# Patient Record
Sex: FEMALE | Race: WHITE | URBAN - METROPOLITAN AREA
[De-identification: names, ages, dates, MRNs, and addresses within clinical notes are randomized per-mention and may not be internally consistent; named-entity substitution may affect disease eponyms.]

---

## 2018-10-10 ENCOUNTER — THERAPY VISIT (OUTPATIENT)
Dept: PHYSICAL THERAPY | Facility: CLINIC | Age: 78
End: 2018-10-10
Payer: COMMERCIAL

## 2018-10-10 DIAGNOSIS — M54.2 NECK PAIN: Primary | ICD-10-CM

## 2018-10-10 PROCEDURE — 97161 PT EVAL LOW COMPLEX 20 MIN: CPT | Mod: GP | Performed by: PHYSICAL THERAPIST

## 2018-10-10 PROCEDURE — 97140 MANUAL THERAPY 1/> REGIONS: CPT | Mod: GP | Performed by: PHYSICAL THERAPIST

## 2018-10-10 PROCEDURE — 97530 THERAPEUTIC ACTIVITIES: CPT | Mod: GP | Performed by: PHYSICAL THERAPIST

## 2018-10-10 NOTE — LETTER
DEPARTMENT OF HEALTH AND HUMAN SERVICES  CENTERS FOR MEDICARE & MEDICAID SERVICES    PLAN/UPDATED PLAN OF PROGRESS FOR OUTPATIENT REHABILITATION    PATIENTS NAME:  Megan Lopez     : 1940    PROVIDER NUMBER:    [id number]    HICN:  [id number]     PROVIDER NAME: [place] [facility] PHYSICAL THERAPY    MEDICAL RECORD NUMBER: [id number]     START OF CARE DATE:  SOC Date: 10/10/18   TYPE:  PT    PRIMARY/TREATMENT DIAGNOSIS: (Pertinent Medical Diagnosis)  Neck pain    VISITS FROM START OF CARE:  Rxs Used: 1     [place] [facility] Initial Evaluation  Physical Therapy Initial Examination/Evaluation      Megan Lopez  is a 78 year old  female referred to physical therapy by Dr. Avila for treatment of neck pain.      DOI/onset 4-5 weeks ago  Mechanism of injury none, chronic in nature  DOS none  Prior treatment physical therapy. Effect of prior treatment good, it was more on the other side.  After so many years, I forgot what I was supposed to do for exercise.      Chief Complaint:   Neck pain down into the head on the right.  Deep inside the shoulder blade.     Pain location: R cervical, head, scapular  Quality: burning  Constant/Intermittent: constant, varies in intensity  Symptoms have worsened since onset.    Current pain 5/10.  Pain at worst 8/10.    Symptoms aggravated by ipad.    Symptoms improved with gentle stretching, soft tissue through the edge of the muscle.     Social history:  Pt is , lives in a home.  Pt has 3 grown children.  6 grandchildren, 2 great grandchildren.  Pt enjoys walking and biking.  The last time I biked it was really annoying the following day.    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    Patient having difficulty with ADLs: sleeping, recently had a pulse ox test and I will be doing a sleep study.    Patient's goals are improve pain.    Patient reports general health as good.  Related medical history hx  breast cancer, sleep disorder.    Surgical History:  Lumpectomy, bunionectomy (3) hysterectomy.    Imaging: none.    Medications:  advil PRN, crestor, zolpidem.     PATIENTS NAME:  Megan Lopez     Outcome measure:   NDI  Return to MD:  As needed.      Clinical Impression: Megan presents to [facility] with primary complaint of neck pain.  Per clinical examination, pt demonstrates significant restrictions through the right upper cervical region with postural syndrome.  Pt with positive response to postural re-education and manual therapy today in clinic.   To further assess UE muscular strength next.  Pt will benefit from progressive cervical and sofy-scapular strengthening to improve overall pain and functional tolerance.  See plan of care outlined below.      Subjective:  Seated:   - mild forward head, rounded shoulders.  Fair posture.    - (-) shoulder screen                 Objective:      Cervical/Thoracic Evaluation  AROM:  AROM Cervical:  Flexion:            23  Extension:       27  Rotation:         Left: 56     Right: 51  Side Bend:      Left: 6 pulling R      Right:  11 deg     Headaches: migraine     Shoulder Evaluation:  ROM:  AROM:  : WFL - painfree.    (-) Spurlings, quadrant testing, (-) Alar ligament  (+) manual traction   Palpation: moderate restrictions through R C2-4 cervical paraspinals.    (+) TTP R>L Suboccipitals      Assessment/Plan:    Patient is a 78 year old female with cervical complaints.    Patient has the following significant findings with corresponding treatment plan.                Diagnosis 1:  Neck pain, mid cervical pathology suspected    Pain -  hot/cold therapy, US, manual therapy, self management, education and home program  Decreased ROM/flexibility - manual therapy and therapeutic exercise  Decreased joint mobility - manual therapy and therapeutic exercise  Decreased strength - therapeutic exercise and therapeutic activities  Impaired muscle performance - neuro  re-education  Decreased function - therapeutic activities  Impaired posture - neuro re-education    Therapy Evaluation Codes:   1) History comprised of:   Personal factors that impact the plan of care:      Past/current experiences.    Comorbidity factors that impact the plan of care are:      Cancer.     Medications impacting care: dvil PRN, crestor, zolpidem..    PATIENTS NAME:  Megan Lopez     2) Examination of Body Systems comprised of:   Body structures and functions that impact the plan of care:      Cervical spine.   Activity limitations that impact the plan of care are:      Reading/Computer work and Sitting.  3) Clinical presentation characteristics are:   Stable/Uncomplicated.  4) Decision-Making    Low complexity using standardized patient assessment instrument and/or measureable assessment of functional outcome.  Cumulative Therapy Evaluation is: Low complexity.    Previous and current functional limitations:  (See Goal Flow Sheet for this information)    Short term and Long term goals: (See Goal Flow Sheet for this information)     Communication ability:  Patient appears to be able to clearly communicate and understand verbal and written communication and follow directions correctly.  Treatment Explanation - The following has been discussed with the patient:   RX ordered/plan of care  Anticipated outcomes  Possible risks and side effects  This patient would benefit from PT intervention to resume normal activities.   Rehab potential is good.    Frequency:  2 X week, once daily  Duration:  for 4 weeks, then 1x/week for 4 weeks.  Discharge Plan:  Achieve all LTG.  Independent in home treatment program.  Reach maximal therapeutic benefit.    Please refer to the daily flowsheet for treatment today, total treatment time and time spent performing 1:1 timed codes.       Caregiver Signature/Credentials _____________________________ Date ________       Treating Provider: Viviane Tapia DPT    I have  "reviewed and certified the need for these services and plan of treatment while under my care.        PHYSICIAN'S SIGNATURE:   _________________________________________  Date___________   M Viktoriya Avila MD    Certification period:  Beginning of Cert date period: 10/10/18 to  End of Cert period date: 12/08/18     Functional Level Progress Report: Please see attached \"Goal Flow sheet for Functional level.\"    ____X____ Continue Services or       ________ DC Services                Service dates: From  SOC Date: 10/10/18 date to present                         "

## 2018-10-10 NOTE — PROGRESS NOTES
[place] [facility] Initial Evaluation  Physical Therapy Initial Examination/Evaluation    October 10, 2018    Megan Lopez  is a 78 year old  female referred to physical therapy by Dr. Avila for treatment of neck pain.      DOI/onset 4-5 weeks ago; 9/5/2018  Mechanism of injury none, chronic in nature  DOS none  Prior treatment physical therapy. Effect of prior treatment good, it was more on the other side.  After so many years, I forgot what I was supposed to do for exercise.      Chief Complaint:   Neck pain down into the head on the right.  Deep inside the shoulder blade.     Pain location: R cervical, head, scapular  Quality: burning  Constant/Intermittent: constant, varies in intensity  Symptoms have worsened since onset.    Current pain 5/10.  Pain at worst 8/10.    Symptoms aggravated by ipad.    Symptoms improved with gentle stretching, soft tissue through the edge of the muscle.     Social history:  Pt is , lives in a home.  Pt has 3 grown children.  6 grandchildren, 2 great grandchildren.  Pt enjoys walking and biking.  The last time I biked it was really annoying the following day.    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    Patient having difficulty with ADLs: sleeping, recently had a pulse ox test and I will be doing a sleep study.    Patient's goals are improve pain.    Patient reports general health as good.  Related medical history hx breast cancer, sleep disorder.    Surgical History:  Lumpectomy, bunionectomy (3) hysterectomy.    Imaging: none.    Medications:  advil PRN, crestor, zolpidem.       Outcome measure:   NDI  Return to MD:  As needed.      Clinical Impression: Megan presents to [facility] with primary complaint of neck pain.  Per clinical examination, pt demonstrates significant restrictions through the right upper cervical region with postural syndrome.  Pt with positive response to postural re-education and manual therapy today  in clinic.   To further assess UE muscular strength next.  Pt will benefit from progressive cervical and sofy-scapular strengthening to improve overall pain and functional tolerance.  See plan of care outlined below.    Subjective:    Seated:   - mild forward head, rounded shoulders.  Fair posture.    - (-) shoulder screen     HPI                    Objective:  System              Cervical/Thoracic Evaluation    AROM:  AROM Cervical:    Flexion:            23  Extension:       27  Rotation:         Left: 56     Right: 51  Side Bend:      Left: 6 pulling R      Right:  11 deg       Headaches: migraine                         Shoulder Evaluation:  ROM:  AROM:  : WFL - painfree.                                                                         (-) Spurlings, quadrant testing, (-) Alar ligament  (+) manual traction   Palpation: moderate restrictions through R C2-4 cervical paraspinals.    (+) TTP R>L Suboccipitals      General     ROS    Assessment/Plan:    Patient is a 78 year old female with cervical complaints.    Patient has the following significant findings with corresponding treatment plan.                Diagnosis 1:  Neck pain, mid cervical pathology suspected    Pain -  hot/cold therapy, US, manual therapy, self management, education and home program  Decreased ROM/flexibility - manual therapy and therapeutic exercise  Decreased joint mobility - manual therapy and therapeutic exercise  Decreased strength - therapeutic exercise and therapeutic activities  Impaired muscle performance - neuro re-education  Decreased function - therapeutic activities  Impaired posture - neuro re-education    Therapy Evaluation Codes:   1) History comprised of:   Personal factors that impact the plan of care:      Past/current experiences.    Comorbidity factors that impact the plan of care are:      Cancer.     Medications impacting care: dvil PRN, crestor, zolpidem..  2) Examination of Body Systems comprised of:   Body  structures and functions that impact the plan of care:      Cervical spine.   Activity limitations that impact the plan of care are:      Reading/Computer work and Sitting.  3) Clinical presentation characteristics are:   Stable/Uncomplicated.  4) Decision-Making    Low complexity using standardized patient assessment instrument and/or measureable assessment of functional outcome.  Cumulative Therapy Evaluation is: Low complexity.    Previous and current functional limitations:  (See Goal Flow Sheet for this information)    Short term and Long term goals: (See Goal Flow Sheet for this information)     Communication ability:  Patient appears to be able to clearly communicate and understand verbal and written communication and follow directions correctly.  Treatment Explanation - The following has been discussed with the patient:   RX ordered/plan of care  Anticipated outcomes  Possible risks and side effects  This patient would benefit from PT intervention to resume normal activities.   Rehab potential is good.    Frequency:  2 X week, once daily  Duration:  for 4 weeks, then 1x/week for 4 weeks.  Discharge Plan:  Achieve all LTG.  Independent in home treatment program.  Reach maximal therapeutic benefit.    Please refer to the daily flowsheet for treatment today, total treatment time and time spent performing 1:1 timed codes.

## 2018-10-10 NOTE — LETTER
DEPARTMENT OF HEALTH AND HUMAN SERVICES  CENTERS FOR MEDICARE & MEDICAID SERVICES    PLAN/UPDATED PLAN OF PROGRESS FOR OUTPATIENT REHABILITATION    PATIENTS NAME:  Megan Lopez     : 1940    PROVIDER NUMBER:    [id number]    HICN:  [id number]    PROVIDER NAME: [place] [facility] PHYSICAL THERAPY    MEDICAL RECORD NUMBER: [id number]     START OF CARE DATE:  SOC Date: 10/10/18   TYPE:  PT    PRIMARY/TREATMENT DIAGNOSIS: (Pertinent Medical Diagnosis)  Neck pain    VISITS FROM START OF CARE:  Rxs Used: 1     [place] [facility] Initial Evaluation  Physical Therapy Initial Examination/Evaluation    October 10, 2018    Megan Lopez  is a 78 year old  female referred to physical therapy by Dr. Avila for treatment of neck pain.      DOI/onset 4-5 weeks ago; 2018  Mechanism of injury none, chronic in nature  DOS none  Prior treatment physical therapy. Effect of prior treatment good, it was more on the other side.  After so many years, I forgot what I was supposed to do for exercise.      Chief Complaint:   Neck pain down into the head on the right.  Deep inside the shoulder blade.     Pain location: R cervical, head, scapular  Quality: burning  Constant/Intermittent: constant, varies in intensity  Symptoms have worsened since onset.    Current pain 5/10.  Pain at worst 8/10.    Symptoms aggravated by ipad.    Symptoms improved with gentle stretching, soft tissue through the edge of the muscle.     Social history:  Pt is , lives in a home.  Pt has 3 grown children.  6 grandchildren, 2 great grandchildren.  Pt enjoys walking and biking.  The last time I biked it was really annoying the following day.    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    Patient having difficulty with ADLs: sleeping, recently had a pulse ox test and I will be doing a sleep study.    Patient's goals are improve pain.  PATIENTS NAME:  Megan Lopez      Patient reports general health as good.  Related medical history hx breast cancer, sleep disorder.    Surgical History:  Lumpectomy, bunionectomy (3) hysterectomy.    Imaging: none.    Medications:  advil PRN, crestor, zolpidem.    Outcome measure:   NDI  Return to MD:  As needed.      Clinical Impression: Megan presents to [facility] with primary complaint of neck pain.  Per clinical examination, pt demonstrates significant restrictions through the right upper cervical region with postural syndrome.  Pt with positive response to postural re-education and manual therapy today in clinic.   To further assess UE muscular strength next.  Pt will benefit from progressive cervical and sofy-scapular strengthening to improve overall pain and functional tolerance.  See plan of care outlined below.    Subjective:  Seated:   - mild forward head, rounded shoulders.  Fair posture.    - (-) shoulder screen     Cervical/Thoracic Evaluation  AROM:  AROM Cervical:  Flexion:            23  Extension:       27  Rotation:         Left: 56     Right: 51  Side Bend:      Left: 6 pulling R      Right:  11 deg     Headaches: migraine      Shoulder Evaluation:  AROM:  : WFL - painfree.  (-) Spurlings, quadrant testing, (-) Alar ligament  (+) manual traction   Palpation: moderate restrictions through R C2-4 cervical paraspinals.    (+) TTP R>L Suboccipitals      Assessment/Plan:    Patient is a 78 year old female with cervical complaints.    Patient has the following significant findings with corresponding treatment plan.                Diagnosis 1:  Neck pain, mid cervical pathology suspected    Pain -  hot/cold therapy, US, manual therapy, self management, education and home program  Decreased ROM/flexibility - manual therapy and therapeutic exercise  Decreased joint mobility - manual therapy and therapeutic exercise  Decreased strength - therapeutic exercise and therapeutic activities  Impaired muscle performance - neuro  re-education  Decreased function - therapeutic activities  Impaired posture - neuro re-education        PATIENTS NAME:  Megan Lopez     Therapy Evaluation Codes:   1) History comprised of:   Personal factors that impact the plan of care:      Past/current experiences.    Comorbidity factors that impact the plan of care are:      Cancer.     Medications impacting care: dvil PRN, crestor, zolpidem..  2) Examination of Body Systems comprised of:   Body structures and functions that impact the plan of care:      Cervical spine.   Activity limitations that impact the plan of care are:      Reading/Computer work and Sitting.  3) Clinical presentation characteristics are:   Stable/Uncomplicated.  4) Decision-Making    Low complexity using standardized patient assessment instrument and/or measureable assessment of functional outcome.  Cumulative Therapy Evaluation is: Low complexity.    Previous and current functional limitations:  (See Goal Flow Sheet for this information)    Short term and Long term goals: (See Goal Flow Sheet for this information)   Communication ability:  Patient appears to be able to clearly communicate and understand verbal and written communication and follow directions correctly.  Treatment Explanation - The following has been discussed with the patient:   RX ordered/plan of care  Anticipated outcomes  Possible risks and side effects  This patient would benefit from PT intervention to resume normal activities.   Rehab potential is good.  Frequency:  2 X week, once daily  Duration:  for 4 weeks, then 1x/week for 4 weeks.  Discharge Plan:  Achieve all LTG.  Independent in home treatment program.  Reach maximal therapeutic benefit.    Caregiver Signature/Credentials _____________________________ Date ________       Treating Provider: Viviane Tapia DPT    I have reviewed and certified the need for these services and plan of treatment while under my care.      PHYSICIAN'S SIGNATURE:    "_________________________________________  Date___________   M Viktoriya Avila MD     Certification period:  Beginning of Cert date period: 10/10/18 to  End of Cert period date: 12/08/18     Functional Level Progress Report: Please see attached \"Goal Flow sheet for Functional level.\"  ____X____ Continue Services or       ________ DC Services                Service dates: From  SOC Date: 10/10/18 date to present                         "

## 2018-10-10 NOTE — MR AVS SNAPSHOT
After Visit Summary   10/10/2018    Megan Lopez    MRN: [id number]           Patient Information     Date Of Birth          1940        Visit Information        Provider Department      10/10/2018 8:50 AM Viviane Tapia PT [facility]        Today's Diagnoses     Neck pain    -  1       Follow-ups after your visit        Your next 10 appointments already scheduled     Oct 16, 2018  9:30 AM CDT   CHRISTA Spine with Viviane Tapia PT   [facility] ([facility]  )    [address] [place] [id number] [phone]            Oct 23, 2018 10:50 AM CDT   CHRISTA Spine with Viviane Tapia PT   [facility] ([address] [address] [phone]              Who to contact     If you have questions or need follow up information about today's clinic visit or your schedule please [facility] directly at [phone].  Normal or non-critical lab and imaging results will be communicated to you by MyChart, letter or phone within 4 business days after the clinic has received the results. If you do not hear from us within 7 days, please contact the clinic through MyChart or phone. If you have a critical or abnormal lab result, we will notify you by phone as soon as possible.  Submit refill requests through [employer] or call your pharmacy and they will forward the refill request to us. Please allow 3 business days for your refill to be completed.          Additional Information About Your Visit        Care EveryWhere ID     This is your Care EveryWhere ID. This could be used by other organizations to access your [place] medical records  [id number]         Blood Pressure from Last 3 Encounters:   No data found for BP    Weight from Last 3  Encounters:   No data found for Wt              We Performed the Following     CHRISTA CERT REPORT     CHRISTA Inital Eval Report     Manual Ther Tech, 1+Regions, EA 15 min     PT Eval, Low Complexity ([id number])     Therapeutic Activities        Primary Care Provider Office Phone # Fax #    [facility] [phone]       [address] [place] [id number]        Equal Access to Services     [facility] SHANTANU : Hadii aad ku hadasho Soomaali, waaxda luqadaha, qaybta kaalmada adeegyada, waxay idiin hayaan adeeg kharash la'aan ah. So [facility] [phone].    ATENCIÓN: Si habla español, tiene a taveras disposición servicios gratuitos de asistencia lingüística. Llame al [phone].    We comply with applicable federal civil rights laws and [place] laws. We do not discriminate on the basis of race, color, national origin, age, disability, sex, sexual orientation, or gender identity.            Thank you!     Thank you for choosing [place] [facility] PHYSICAL THERAPY  for your care. Our goal is always to provide you with excellent care. Hearing back from our patients is one way we can continue to improve our services. Please take a few minutes to complete the written survey that you may receive in the mail after your visit with us. Thank you!             Your Updated Medication List - Protect others around you: Learn how to safely use, store and throw away your medicines at www.disposemymeds.org.      Notice  As of 10/10/2018  8:25 PM    You have not been prescribed any medications.

## 2018-10-16 ENCOUNTER — THERAPY VISIT (OUTPATIENT)
Dept: PHYSICAL THERAPY | Facility: CLINIC | Age: 78
End: 2018-10-16
Payer: COMMERCIAL

## 2018-10-16 DIAGNOSIS — M54.2 NECK PAIN: ICD-10-CM

## 2018-10-16 PROCEDURE — 97035 APP MDLTY 1+ULTRASOUND EA 15: CPT | Mod: GP | Performed by: PHYSICAL THERAPIST

## 2018-10-16 PROCEDURE — 97140 MANUAL THERAPY 1/> REGIONS: CPT | Mod: GP | Performed by: PHYSICAL THERAPIST

## 2018-10-16 PROCEDURE — 97112 NEUROMUSCULAR REEDUCATION: CPT | Mod: GP | Performed by: PHYSICAL THERAPIST

## 2018-10-23 ENCOUNTER — THERAPY VISIT (OUTPATIENT)
Dept: PHYSICAL THERAPY | Facility: CLINIC | Age: 78
End: 2018-10-23
Payer: COMMERCIAL

## 2018-10-23 DIAGNOSIS — M54.2 NECK PAIN: ICD-10-CM

## 2018-10-23 PROCEDURE — 97035 APP MDLTY 1+ULTRASOUND EA 15: CPT | Mod: GP | Performed by: PHYSICAL THERAPIST

## 2018-10-23 PROCEDURE — 97110 THERAPEUTIC EXERCISES: CPT | Mod: GP | Performed by: PHYSICAL THERAPIST

## 2018-10-23 PROCEDURE — 97140 MANUAL THERAPY 1/> REGIONS: CPT | Mod: GP | Performed by: PHYSICAL THERAPIST

## 2018-11-06 ENCOUNTER — THERAPY VISIT (OUTPATIENT)
Dept: PHYSICAL THERAPY | Facility: CLINIC | Age: 78
End: 2018-11-06
Payer: COMMERCIAL

## 2018-11-06 DIAGNOSIS — M54.2 NECK PAIN: ICD-10-CM

## 2018-11-06 PROCEDURE — 97110 THERAPEUTIC EXERCISES: CPT | Mod: GP | Performed by: PHYSICAL THERAPIST

## 2018-11-06 PROCEDURE — 97035 APP MDLTY 1+ULTRASOUND EA 15: CPT | Mod: GP | Performed by: PHYSICAL THERAPIST

## 2018-11-06 PROCEDURE — 97140 MANUAL THERAPY 1/> REGIONS: CPT | Mod: GP | Performed by: PHYSICAL THERAPIST

## 2018-11-15 ENCOUNTER — THERAPY VISIT (OUTPATIENT)
Dept: PHYSICAL THERAPY | Facility: CLINIC | Age: 78
End: 2018-11-15
Payer: COMMERCIAL

## 2018-11-15 DIAGNOSIS — M54.2 NECK PAIN: ICD-10-CM

## 2018-11-15 PROCEDURE — 97012 MECHANICAL TRACTION THERAPY: CPT | Mod: GP | Performed by: PHYSICAL THERAPIST

## 2018-11-15 PROCEDURE — 97035 APP MDLTY 1+ULTRASOUND EA 15: CPT | Mod: GP | Performed by: PHYSICAL THERAPIST

## 2018-11-15 PROCEDURE — 97140 MANUAL THERAPY 1/> REGIONS: CPT | Mod: GP | Performed by: PHYSICAL THERAPIST

## 2018-11-15 PROCEDURE — 97110 THERAPEUTIC EXERCISES: CPT | Mod: GP | Performed by: PHYSICAL THERAPIST

## 2018-11-15 NOTE — MR AVS SNAPSHOT
After Visit Summary   11/15/2018    Megan Lopez    MRN: [id number]           Patient Information     Date Of Birth          1940        Visit Information        Provider Department      11/15/2018 1:20 PM Viviane Tapia, DARIUSZ [facility] Physical Therapy        Today's Diagnoses     Neck pain           Follow-ups after your visit        Your next 10 appointments already scheduled     Nov 20, 2018 10:50 AM CST   CHRISTA Spine with Viviane Tapia PT   [facility] Physical Therapy ([facility]  )    [address] [place] [id number] [phone]              Who to contact     If you have questions or need follow up information about today's clinic visit or your schedule please contact [facility] PHYSICAL THERAPY directly at [phone].  Normal or non-critical lab and imaging results will be communicated to you by MyChart, letter or phone within 4 business days after the clinic has received the results. If you do not hear from us within 7 days, please contact the clinic through MyChart or phone. If you have a critical or abnormal lab result, we will notify you by phone as soon as possible.  Submit refill requests through [employer] or call your pharmacy and they will forward the refill request to us. Please allow 3 business days for your refill to be completed.          Additional Information About Your Visit        Care EveryWhere ID     This is your Care EveryWhere ID. This could be used by other organizations to access your [place] medical records  [id number]         Blood Pressure from Last 3 Encounters:   No data found for BP    Weight from Last 3 Encounters:   No data found for Wt              We Performed the Following     CHRISTA Progress Notes Report     Manual Ther Tech, 1+Regions, EA 15 min     Mechanical Traction Therapy     Therapeutic Exercises     Ultrasound Therapy         Primary Care Provider Office Phone # Fax #    [facility] [phone]       [address] [place] [id number]        Equal Access to Services     REMINGTON FOURNIER : Hadii aad ku hadnagarenata Dutch, waharikada luqgrant, joaquínta kaalmada alejandrina, joel serranoluis fernando akersalex montes de ocajarod fong. So [facility] [phone].    ATENCIÓN: Si habla español, tiene a taveras disposición servicios gratuitos de asistencia lingüística. Llame al [phone].    We comply with applicable federal civil rights laws and [place] laws. We do not discriminate on the basis of race, color, national origin, age, disability, sex, sexual orientation, or gender identity.            Thank you!     Thank you for choosing [facility]  for your care. Our goal is always to provide you with excellent care. Hearing back from our patients is one way we can continue to improve our services. Please take a few minutes to complete the written survey that you may receive in the mail after your visit with us. Thank you!             Your Updated Medication List - Protect others around you: Learn how to safely use, store and throw away your medicines at www.disposemymeds.org.      Notice  As of 11/15/2018 11:59 PM    You have not been prescribed any medications.

## 2018-11-15 NOTE — PROGRESS NOTES
Subjective:  HPI                    Objective:  System    Physical Exam    General     ROS    Assessment/Plan:    PROGRESS  REPORT    Progress reporting period is from 10/10/2018 to 11/19/2018.       SUBJECTIVE  I feel like I am making progress.  I really like the towel stretch to keep my range of motion.  Right now 0/10 pain rest; 3-4/10 pain with movement on the right side.     Current pain level is 3/10  .     Previous pain level was 5/10.   Changes in function:  Yes (See Goal flowsheet attached for changes in current functional level)  Adverse reaction to treatment or activity: None    OBJECTIVE  Changes noted in objective findings:  The objective findings below are from DOS 11/15/2018.  - Flex: 50 deg, Ext 46 deg, Rot R 60, L 66   - Palpation: mod-max restrictions with noted joint mobility deficits C4-5 R   - Advanced strengthening program to include prone and supine deep neck flexor strength and muscular endurance.          ASSESSMENT/PLAN  Updated problem list and treatment plan: Diagnosis 1:  Neck pain    Pain -  hot/cold therapy, US, manual therapy, self management, education and home program  Decreased ROM/flexibility - manual therapy and therapeutic exercise  Decreased joint mobility - manual therapy and therapeutic exercise  Decreased strength - therapeutic exercise and therapeutic activities  Impaired muscle performance - neuro re-education  Decreased function - therapeutic activities  Impaired posture - neuro re-education  STG/LTGs have been met or progress has been made towards goals:  Yes (See Goal flow sheet completed today.)  Assessment of Progress: The patient's condition is improving.  Self Management Plans:  Patient has been instructed in a home treatment program.  Patient  has been instructed in self management of symptoms.  I have re-evaluated this patient and find that the nature, scope, duration and intensity of the therapy is appropriate for the medical condition of the patient.  Megan  continues to require the following intervention to meet STG and LTG's:  PT    Recommendations:  This patient would benefit from continued therapy.     Frequency:  2 X a month, once daily  Duration:  for 2 months        Please refer to the daily flowsheet for treatment today, total treatment time and time spent performing 1:1 timed codes.

## 2018-11-20 ENCOUNTER — THERAPY VISIT (OUTPATIENT)
Dept: PHYSICAL THERAPY | Facility: CLINIC | Age: 78
End: 2018-11-20
Payer: COMMERCIAL

## 2018-11-20 DIAGNOSIS — M54.2 NECK PAIN: ICD-10-CM

## 2018-11-20 PROCEDURE — 97035 APP MDLTY 1+ULTRASOUND EA 15: CPT | Mod: GP | Performed by: PHYSICAL THERAPIST

## 2018-11-20 PROCEDURE — 97012 MECHANICAL TRACTION THERAPY: CPT | Mod: GP | Performed by: PHYSICAL THERAPIST

## 2018-11-20 PROCEDURE — 97530 THERAPEUTIC ACTIVITIES: CPT | Mod: GP | Performed by: PHYSICAL THERAPIST

## 2018-11-20 PROCEDURE — 97140 MANUAL THERAPY 1/> REGIONS: CPT | Mod: GP | Performed by: PHYSICAL THERAPIST

## 2019-01-15 ENCOUNTER — THERAPY VISIT (OUTPATIENT)
Dept: PHYSICAL THERAPY | Facility: CLINIC | Age: 79
End: 2019-01-15
Payer: MEDICARE

## 2019-01-15 DIAGNOSIS — M54.2 NECK PAIN: ICD-10-CM

## 2019-01-15 PROCEDURE — 97530 THERAPEUTIC ACTIVITIES: CPT | Mod: GP | Performed by: PHYSICAL THERAPIST

## 2019-01-15 PROCEDURE — 97012 MECHANICAL TRACTION THERAPY: CPT | Mod: GP | Performed by: PHYSICAL THERAPIST

## 2019-01-15 PROCEDURE — 97110 THERAPEUTIC EXERCISES: CPT | Mod: GP | Performed by: PHYSICAL THERAPIST

## 2019-01-15 NOTE — LETTER
DEPARTMENT OF HEALTH AND HUMAN SERVICES  CENTERS FOR MEDICARE & MEDICAID SERVICES    PLAN/UPDATED PLAN OF PROGRESS FOR OUTPATIENT REHABILITATION    PATIENTS NAME:  Megan Lopez     : 1940    PROVIDER NUMBER:    [id number]    HICN:  [id number]     PROVIDER NAME: [facility] PH[facility]ERAP[facility]    MEDICAL RECORD NUMBER: [id number]     START OF CARE DATE:  SOC Date: 10/10/18   TYPE:  PT    PRIMARY/TREATMENT DIAGNOSIS: (Pertinent Medical Diagnosis)  Neck pain    VISITS FROM START OF CARE:  Rxs Used: 7     PROGRESS  REPORT  Progress reporting period is from 2018 to 1/15/2019.       SUBJECTIVE  Subjective changes noted by patient:  I learned something since I was here.  I know I carry my stress in my neck.  If I am late I can really feel it in my neck.  Last night I was sore, I ended up taking an Aleve before I went to bed.  I am afraid of them, I hear about people damaging their Kidneys.  If I do every other day I am ok.      Last week I was taking care of my brother and sister in law; one had a shoulder replacement and another had pneumonia.  I was sleeping on a love seat and that was not good for my neck either.    My PT exercises are going ok.  I continue doing them a couple of times/week.  They feel pretty easy.        Current pain level is 0/10, with rotation 3-4/10.     Previous pain level was 5/10.   Changes in function:  Yes (See Goal flowsheet attached for changes in current functional level)  Adverse reaction to treatment or activity: None    OBJECTIVE  Changes noted in objective findings:  The objective findings below are from DOS 1/15/2019.    Palpation: mod tenderness through R cervical parapsinals.  Non-tender spinous process throughout cervical region.    AROM: flexion 30 deg, extension 23 deg, rotation R 48 deg, L 51 deg.  Downward gaze L.  SB R 21 deg, L 18 deg  Strength: WFL.        ASSESSMENT/PLAN  Updated problem list and treatment plan: Diagnosis 1:   "Neck pain without radiculopathy    Pain -  hot/cold therapy, US, manual therapy, self management, education and home program  Decreased ROM/flexibility - manual therapy and therapeutic exercise  PATIENTS NAME:  Megan Lopez     Decreased joint mobility - manual therapy and therapeutic exercise  Decreased strength - therapeutic exercise and therapeutic activities  Impaired muscle performance - neuro re-education  Decreased function - therapeutic activities  Impaired posture - neuro re-education  STG/LTGs have been met or progress has been made towards goals:  Yes (See Goal flow sheet completed today.)  Assessment of Progress: The patient's condition is improving.  Self Management Plans:  Patient has been instructed in a home treatment program.  Patient  has been instructed in self management of symptoms.  I have re-evaluated this patient and find that the nature, scope, duration and intensity of the therapy is appropriate for the medical condition of the patient.  Megan continues to require the following intervention to meet STG and LTG's:  PT    Recommendations:  This patient would benefit from continued therapy.     Frequency:  2 X month, once daily  Duration:  for 1 months, then 1 visit over 1 month.      Please refer to the daily flowsheet for treatment today, total treatment time and time spent performing 1:1 timed codes.      Caregiver Signature/Credentials _____________________________ Date ________      Treating Provider: Viviane Tapia DPT and Ana Mehta PTA     I have reviewed and certified the need for these services and plan of treatment while under my care.        PHYSICIAN'S SIGNATURE:   _________________________________________  Date___________   VANIA Avila MD    Certification period:  Beginning of Cert date period: 12/09/18 to  End of Cert period date: 03/04/19     Functional Level Progress Report: Please see attached \"Goal Flow sheet for Functional level.\"    ____X____ Continue " Services or       ________ DC Services                Service dates: From  SOC Date: 10/10/18 date to present

## 2019-01-15 NOTE — LETTER
[facility] PHYSICAL THERAPY   [address] [address]  [phone]    2019    Re: Megan Lopez   :   1940  MRN:  [id number]   REFERRING PHYSICIAN:   VANIA Avila    [facility]TH[facility] PHYSICAL [facility]  Date of Initial Evaluation:  10/10/18  Visits:  Rxs Used: 7  Reason for Referral:  Neck pain      PROGRESS  REPORT  Progress reporting period is from 2018 to 1/15/2019.       SUBJECTIVE  Subjective changes noted by patient:  I learned something since I was here.  I know I carry my stress in my neck.  If I am late I can really feel it in my neck.  Last night I was sore, I ended up taking an Aleve before I went to bed.  I am afraid of them, I hear about people damaging their Kidneys.  If I do every other day I am ok.      Last week I was taking care of my brother and sister in law; one had a shoulder replacement and another had pneumonia.  I was sleeping on a love seat and that was not good for my neck either.    My PT exercises are going ok.  I continue doing them a couple of times/week.  They feel pretty easy.        Current pain level is 0/10, with rotation 3-4/10.     Previous pain level was 5/10.   Changes in function:  Yes (See Goal flowsheet attached for changes in current functional level)  Adverse reaction to treatment or activity: None    OBJECTIVE  Changes noted in objective findings:  The objective findings below are from DOS 1/15/2019.    Palpation: mod tenderness through R cervical parapsinals.  Non-tender spinous process throughout cervical region.    AROM: flexion 30 deg, extension 23 deg, rotation R 48 deg, L 51 deg.  Downward gaze L.  SB R 21 deg, L 18 deg  Strength: WFL.        ASSESSMENT/PLAN  Updated problem list and treatment plan: Diagnosis 1:  Neck pain without radiculopathy    Pain -  hot/cold therapy, US, manual therapy, self management, education and home program  Decreased  ROM/flexibility - manual therapy and therapeutic exercise  Decreased joint mobility - manual therapy and therapeutic exercise  Decreased strength - therapeutic exercise and therapeutic activities  Impaired muscle performance - neuro re-education  Decreased function - therapeutic activities  Impaired posture - neuro re-education  STG/LTGs have been met or progress has been made towards goals:  Yes (See Goal flow sheet completed today.)    Re: Megan Lopez     Assessment of Progress: The patient's condition is improving.  Self Management Plans:  Patient has been instructed in a home treatment program.  Patient  has been instructed in self management of symptoms.  I have re-evaluated this patient and find that the nature, scope, duration and intensity of the therapy is appropriate for the medical condition of the patient.  Megan continues to require the following intervention to meet STG and LTG's:  PT    Recommendations:  This patient would benefit from continued therapy.     Frequency:  2 X month, once daily  Duration:  for 1 months, then 1 visit over 1 month.      Please refer to the daily flowsheet for treatment today, total treatment time and time spent performing 1:1 timed codes.    Thank you for your referral.    INQUIRIES  Therapist:Viviane Tapia DPT  [facility]  [address] [place] [id number]  Phone: [phone]  Fax: [phone]

## 2019-01-15 NOTE — PROGRESS NOTES
Subjective:  HPI                    Objective:  System    Physical Exam    General     ROS    Assessment/Plan:    PROGRESS  REPORT    Progress reporting period is from 11/19/2018 to 1/15/2019.       SUBJECTIVE  Subjective changes noted by patient:  I learned something since I was here.  I know I carry my stress in my neck.  If I am late I can really feel it in my neck.  Last night I was sore, I ended up taking an Aleve before I went to bed.  I am afraid of them, I hear about people damaging their Kidneys.  If I do every other day I am ok.      Last week I was taking care of my brother and sister in law; one had a shoulder replacement and another had pneumonia.  I was sleeping on a love seat and that was not good for my neck either.    My PT exercises are going ok.  I continue doing them a couple of times/week.  They feel pretty easy.        Current pain level is 0/10, with rotation 3-4/10.     Previous pain level was 5/10.   Changes in function:  Yes (See Goal flowsheet attached for changes in current functional level)  Adverse reaction to treatment or activity: None    OBJECTIVE  Changes noted in objective findings:  The objective findings below are from DOS 1/15/2019.    Palpation: mod tenderness through R cervical parapsinals.  Non-tender spinous process throughout cervical region.    AROM: flexion 30 deg, extension 23 deg, rotation R 48 deg, L 51 deg.  Downward gaze L.  SB R 21 deg, L 18 deg  Strength: WFL.          ASSESSMENT/PLAN  Updated problem list and treatment plan: Diagnosis 1:  Neck pain without radiculopathy    Pain -  hot/cold therapy, US, manual therapy, self management, education and home program  Decreased ROM/flexibility - manual therapy and therapeutic exercise  Decreased joint mobility - manual therapy and therapeutic exercise  Decreased strength - therapeutic exercise and therapeutic activities  Impaired muscle performance - neuro re-education  Decreased function - therapeutic  activities  Impaired posture - neuro re-education  STG/LTGs have been met or progress has been made towards goals:  Yes (See Goal flow sheet completed today.)  Assessment of Progress: The patient's condition is improving.  Self Management Plans:  Patient has been instructed in a home treatment program.  Patient  has been instructed in self management of symptoms.  I have re-evaluated this patient and find that the nature, scope, duration and intensity of the therapy is appropriate for the medical condition of the patient.  Megan continues to require the following intervention to meet STG and LTG's:  PT    Recommendations:  This patient would benefit from continued therapy.     Frequency:  2 X month, once daily  Duration:  for 1 months, then 1 visit over 1 month.          Please refer to the daily flowsheet for treatment today, total treatment time and time spent performing 1:1 timed codes.

## 2019-02-05 ENCOUNTER — THERAPY VISIT (OUTPATIENT)
Dept: PHYSICAL THERAPY | Facility: CLINIC | Age: 79
End: 2019-02-05
Payer: MEDICARE

## 2019-02-05 DIAGNOSIS — M54.2 NECK PAIN: ICD-10-CM

## 2019-02-05 PROCEDURE — 97110 THERAPEUTIC EXERCISES: CPT | Mod: GP | Performed by: PHYSICAL THERAPIST

## 2019-02-05 PROCEDURE — 97530 THERAPEUTIC ACTIVITIES: CPT | Mod: GP | Performed by: PHYSICAL THERAPIST

## 2019-02-05 PROCEDURE — G8982 BODY POS GOAL STATUS: HCPCS | Mod: GP | Performed by: PHYSICAL THERAPIST

## 2019-02-05 PROCEDURE — G8983 BODY POS D/C STATUS: HCPCS | Mod: GP | Performed by: PHYSICAL THERAPIST

## 2019-02-05 PROCEDURE — 97012 MECHANICAL TRACTION THERAPY: CPT | Mod: GP | Performed by: PHYSICAL THERAPIST

## 2019-02-05 PROCEDURE — G8981 BODY POS CURRENT STATUS: HCPCS | Mod: GP | Performed by: PHYSICAL THERAPIST

## 2019-02-05 NOTE — LETTER
[facility] PHYSICAL THERAPY   [address] [address]  [phone]    2019    Re: Megan Lopez   :   1940  MRN:  [id number]   REFERRING PHYSICIAN:   VANIA Avila    [facility] ATHLETIC [facility] PHYSICAL [facility]    Date of Initial Evaluation:  1/15/2019  Visits:  Rxs Used: 8  Reason for Referral:  Neck pain    PROGRESS  REPORT    Progress reporting period is from 1/15/2019 to 2019.       SUBJECTIVE  I am doing well.   I still feel some soreness on the upper part of the neck on the right side.  I have made a few changes.  1.  Use of readers vs glasses with ipad, 2.  Return to gym activity, 3.  weights with the arm circles.  I feel like I have good management of my health at this point.  I would like to get the TENS unit and function independently with my home program.         Changes in function:  Yes (See Goal flowsheet attached for changes in current functional level)  Adverse reaction to treatment or activity: None    OBJECTIVE  Changes noted in objective findings:  The objective findings below are from DOS 2019.    Mod TTP C3-4 R. Continued joint restrictions with soft tissue guarding.  Pt demonstrates and verbalizes independence with HEP.  Cervical traction completed with min assist for home use.  Discussed order for home and self management strategies.  To be discharged at this time.       ASSESSMENT/PLAN  Updated problem list and treatment plan: Diagnosis 1:  Neck pain    Pain -  hot/cold therapy, US, electric stimulation, manual therapy, self management, education and home program  Decreased ROM/flexibility - manual therapy and therapeutic exercise  Decreased strength - therapeutic exercise and therapeutic activities  Impaired balance - neuro re-education and therapeutic activities  Impaired muscle performance - neuro re-education  Decreased function - therapeutic activities  Impaired posture - neuro  re-education  STG/LTGs have been met or progress has been made towards goals:  Yes (See Goal flow sheet completed today.)  Re: Meganbulmaro Lopez    Assessment of Progress: The patient's condition is improving.  Self Management Plans:  Patient has been instructed in a home treatment program.  Patient  has been instructed in self management of symptoms.  I have re-evaluated this patient and find that the nature, scope, duration and intensity of the therapy is appropriate for the medical condition of the patient.  Megan continues to require the following intervention to meet STG and LTG's:  PT    Recommendations:  This patient is ready to be discharged from therapy and continue their home treatment program.    Please refer to the daily flowsheet for treatment today, total treatment time and time spent performing 1:1 timed codes.    Thank you for your referral.    INQUIRIES  Therapist: Viviane Tapia DPT   [facility] PH[facility] THERAP[facility]  [address] [place] [id number]  Phone: [phone]  Fax: [phone]

## 2019-02-05 NOTE — LETTER
2019    RE: Megan Lopez  : 1940    Dear VANIA Avila,    Thank you for referring Megan Lopez to the [facility].  This patient was scheduled for an Initial Physical Therapy appointment on 2019.                    PROGRESS  REPORT    Progress reporting period is from 1/15/2019 to 2019.       SUBJECTIVE  I am doing well.   I still feel some soreness on the upper part of the neck on the right side.  I have made a few changes.  1.  Use of readers vs glasses with ipad, 2.  Return to gym activity, 3.  weights with the arm circles.  I feel like I have good management of my health at this point.  I would like to get the TENS unit and function independently with my home program.         Changes in function:  Yes (See Goal flowsheet attached for changes in current functional level)  Adverse reaction to treatment or activity: None    OBJECTIVE  Changes noted in objective findings:  The objective findings below are from DOS 2019.    Mod TTP C3-4 R. Continued joint restrictions with soft tissue guarding.  Pt demonstrates and verbalizes independence with HEP.  Cervical traction completed with min assist for home use.  Discussed order for home and self management strategies.  To be discharged at this time.       ASSESSMENT/PLAN  Updated problem list and treatment plan: Diagnosis 1:  Neck pain    Pain -  hot/cold therapy, US, electric stimulation, manual therapy, self management, education and home program  Decreased ROM/flexibility - manual therapy and therapeutic exercise  Decreased strength - therapeutic exercise and therapeutic activities  Impaired balance - neuro re-education and therapeutic activities  Impaired muscle performance - neuro re-education  Decreased function - therapeutic activities  Impaired posture - neuro re-education  STG/LTGs have been met or progress has been made towards goals:  Yes (See Goal flow sheet completed today.)  Assessment  of Progress: The patient's condition is improving.  Self Management Plans:  Patient has been instructed in a home treatment program.  Patient  has been instructed in self management of symptoms.    RE: Megan FRANCE Jessica    I have re-evaluated this patient and find that the nature, scope, duration and intensity of the therapy is appropriate for the medical condition of the patient.  Megan continues to require the following intervention to meet STG and LTG's:  PT    Recommendations:  This patient is ready to be discharged from therapy and continue their home treatment program.   If you have further questions regarding this patient, please feel free to contact me at the number below.    Please refer to the daily flowsheet for treatment today, total treatment time and time spent performing 1:1 timed codes.      Sincerely,    JAVON DominguezT  [facility] PH[facility]ICAL THERAP[facility]  [facility] [address] [address] [phone]

## 2019-02-05 NOTE — PROGRESS NOTES
HPI                    Objective:  System    Physical Exam    General     ROS    Assessment/Plan:    PROGRESS  REPORT    Progress reporting period is from 1/15/2019 to 2/5/2019.       SUBJECTIVE  I am doing well.   I still feel some soreness on the upper part of the neck on the right side.  I have made a few changes.  1.  Use of readers vs glasses with ipad, 2.  Return to gym activity, 3.  weights with the arm circles.  I feel like I have good management of my health at this point.  I would like to get the TENS unit and function independently with my home program.         Changes in function:  Yes (See Goal flowsheet attached for changes in current functional level)  Adverse reaction to treatment or activity: None    OBJECTIVE  Changes noted in objective findings:  The objective findings below are from DOS 2/5/2019.    Mod TTP C3-4 R. Continued joint restrictions with soft tissue guarding.  Pt demonstrates and verbalizes independence with HEP.  Cervical traction completed with min assist for home use.  Discussed order for home and self management strategies.  To be discharged at this time.             ASSESSMENT/PLAN  Updated problem list and treatment plan: Diagnosis 1:  Neck pain    Pain -  hot/cold therapy, US, electric stimulation, manual therapy, self management, education and home program  Decreased ROM/flexibility - manual therapy and therapeutic exercise  Decreased strength - therapeutic exercise and therapeutic activities  Impaired balance - neuro re-education and therapeutic activities  Impaired muscle performance - neuro re-education  Decreased function - therapeutic activities  Impaired posture - neuro re-education  STG/LTGs have been met or progress has been made towards goals:  Yes (See Goal flow sheet completed today.)  Assessment of Progress: The patient's condition is improving.  Self Management Plans:  Patient has been instructed in a home treatment program.  Patient  has been instructed in self  management of symptoms.  I have re-evaluated this patient and find that the nature, scope, duration and intensity of the therapy is appropriate for the medical condition of the patient.  Megan continues to require the following intervention to meet STG and LTG's:  PT    Recommendations:  This patient is ready to be discharged from therapy and continue their home treatment program.    Please refer to the daily flowsheet for treatment today, total treatment time and time spent performing 1:1 timed codes.

## 2019-02-10 PROBLEM — M54.2 NECK PAIN: Status: RESOLVED | Noted: 2018-10-10 | Resolved: 2019-02-10
